# Patient Record
(demographics unavailable — no encounter records)

---

## 2024-11-12 NOTE — PHYSICAL EXAM
[Clear] : right tympanic membrane clear [Erythema] : erythema [Bulging] : bulging [Mucoid Discharge] : mucoid discharge [NL] : no abnormal lymph nodes palpated

## 2024-11-12 NOTE — DISCUSSION/SUMMARY
[FreeTextEntry1] : Complete antibiotic course. Potential side effect of antibiotics includes but not limited to diarrhea. Provide ibuprofen as needed for pain or fever. If no improvement within 48 hours return for re-evaluation. Follow up in 2-3 wks if not better

## 2025-04-29 NOTE — HISTORY OF PRESENT ILLNESS
[de-identified] : 6yr old f c/o crusty eyes cough sneezing itchy eyes bilateral ear ache on Allegra [FreeTextEntry6] : In addition to above: fever and coughing x 3 days, fever broke yesterday started c/o BL ear pain today also with watery, itchy red eyes  sneezing taking Allegra no ST, V/D/abd pain no known s/c

## 2025-04-29 NOTE — PHYSICAL EXAM
[TextEntry] : General: alert and active in no apparent distress Eyes: mild periorbital edema and erythema, mild BL conjunctival injection with watery discharge, EOMI, PERRL Ears: cerumen in right EAC, small amount removed with curette, TMs translucent bilaterally, normal landmarks noted, normal canals Nose: clear rhinorrhea with mucosal edema OP: no tonsillar enlargement/exudate, no lesions, no posterior pharyngeal erythema Neck: supple, mobile, tender 3mm right cervical LN Lungs: clear to auscultation bilaterally, good air exchange, no retractions, comfortable WOB CVS: Normal rate, regular rhythm, no murmur Abdomen: soft, NTND, normal BS, no HSM Skin: No rashes, lesions or skin changes